# Patient Record
Sex: FEMALE | Race: WHITE | NOT HISPANIC OR LATINO | URBAN - METROPOLITAN AREA
[De-identification: names, ages, dates, MRNs, and addresses within clinical notes are randomized per-mention and may not be internally consistent; named-entity substitution may affect disease eponyms.]

---

## 2020-06-20 ENCOUNTER — OUT OF OFFICE VISIT (OUTPATIENT)
Dept: URBAN - METROPOLITAN AREA MEDICAL CENTER 1 | Facility: MEDICAL CENTER | Age: 80
End: 2020-06-20
Payer: MEDICARE

## 2020-06-20 DIAGNOSIS — D50.9 ANEMIA, IRON DEFICIENCY: ICD-10-CM

## 2020-06-20 DIAGNOSIS — K29.60 ADENOPAPILLOMATOSIS GASTRICA: ICD-10-CM

## 2020-06-20 DIAGNOSIS — K92.1 BLACK STOOL: ICD-10-CM

## 2020-06-20 DIAGNOSIS — D64.89 ANEMIA DUE TO OTHER CAUSE: ICD-10-CM

## 2020-06-20 DIAGNOSIS — R10.13 ABDOMINAL DISCOMFORT, EPIGASTRIC: ICD-10-CM

## 2020-06-20 PROCEDURE — 43239 EGD BIOPSY SINGLE/MULTIPLE: CPT | Performed by: INTERNAL MEDICINE

## 2020-06-20 PROCEDURE — 99214 OFFICE O/P EST MOD 30 MIN: CPT | Performed by: INTERNAL MEDICINE

## 2020-06-29 ENCOUNTER — TELEPHONE ENCOUNTER (OUTPATIENT)
Dept: URBAN - METROPOLITAN AREA CLINIC 92 | Facility: CLINIC | Age: 80
End: 2020-06-29

## 2020-06-30 ENCOUNTER — OFFICE VISIT (OUTPATIENT)
Dept: URBAN - METROPOLITAN AREA TELEHEALTH 2 | Facility: TELEHEALTH | Age: 80
End: 2020-06-30
Payer: MEDICARE

## 2020-06-30 DIAGNOSIS — Z12.11 ROUTINE COLON: ICD-10-CM

## 2020-06-30 DIAGNOSIS — K21.9 GERD (GASTROESOPHAGEAL REFLUX DISEASE): ICD-10-CM

## 2020-06-30 DIAGNOSIS — D50.8 OTHER IRON DEFICIENCY ANEMIAS: ICD-10-CM

## 2020-06-30 PROCEDURE — 99442 PHONE E/M BY PHYS 11-20 MIN: CPT | Performed by: NURSE PRACTITIONER

## 2020-06-30 PROCEDURE — 99441 PHONE E/M BY PHYS 5-10 MIN: CPT | Performed by: NURSE PRACTITIONER

## 2020-06-30 RX ORDER — SUCRALFATE 1 G/1
TAKE 1 TABLET BY ORAL ROUTE 4 TIMES A DAY  1 HOUR BEFORE MEALS AND AT BEDTIME, NOT WITH IN ONE HOUR OF OTHER MEDICATIONS TABLET ORAL
Qty: 360 | Refills: 3 | Status: ACTIVE | COMMUNITY
Start: 2018-02-07

## 2020-06-30 RX ORDER — FENOFIBRATE 150 MG/1
TAKE 1 CAPSULE (150 MG) BY ORAL ROUTE ONCE DAILY WITH FOOD CAPSULE ORAL 1
Qty: 0 | Refills: 0 | Status: ACTIVE | COMMUNITY
Start: 1900-01-01

## 2020-06-30 RX ORDER — PANTOPRAZOLE SODIUM 40 MG/1
TAKE 1 TABLET TWICE A DAY TABLET, DELAYED RELEASE ORAL
Qty: 180 | Refills: 3 | Status: ACTIVE | COMMUNITY
Start: 2019-01-21

## 2020-06-30 RX ORDER — SUCRALFATE 1 G/1
TAKE 1 TABLET BY ORAL ROUTE 2 TIMES A DAY  1 HOUR BEFORE LUNCH AND AT BEDTIME, NOT WITH IN ONE HOUR OF OTHER MEDICATIONS TABLET ORAL BID
Qty: 180 | Refills: 3
Start: 2018-02-07 | End: 2019-02-02

## 2020-06-30 RX ORDER — VENLAFAXINE HYDROCHLORIDE 75 MG/1
TAKE 1 TABLET (75 MG) BY ORAL ROUTE ONCE DAILY IN THE MORNING AT THE SAME TIME EACH DAY WITH FOOD TABLET, EXTENDED RELEASE ORAL 1
Qty: 0 | Refills: 0 | Status: ACTIVE | COMMUNITY
Start: 1900-01-01

## 2020-06-30 RX ORDER — RALOXIFENE HYDROCHLORIDE 60 MG/1
TAKE 1 TABLET (60 MG) BY ORAL ROUTE ONCE DAILY TABLET, FILM COATED ORAL 1
Qty: 0 | Refills: 0 | Status: ACTIVE | COMMUNITY
Start: 1900-01-01

## 2020-06-30 RX ORDER — PANTOPRAZOLE SODIUM 40 MG/1
1 TABLET TABLET, DELAYED RELEASE ORAL BID
Qty: 180 TABLET | Refills: 3
Start: 2019-01-21

## 2020-06-30 RX ORDER — SIMVASTATIN 20 MG/1
TAKE 1 TABLET (20 MG) BY ORAL ROUTE ONCE DAILY IN THE EVENING TABLET, FILM COATED ORAL 1
Qty: 0 | Refills: 0 | Status: ACTIVE | COMMUNITY
Start: 1900-01-01

## 2020-06-30 NOTE — HPI-TODAY'S VISIT:
Ms. Nicole is evaluated via telehealth for GERD and OLEGARIO. She was last seen in 2018. She was anemic in 2017 with a work up that showed hpylori and a large hiatal hernia. She was treated with prevpak and stool proved eradication. Her reflux had been controlled with protonix 40mg BID. She ran out of her medication earlier this year. She had been feeling ok until 2 weeks ago. She started having mid chest pain bringing her to the ER. She had an EGD with Dr. Núñez that showed a 8cm hiatal hernia and camerons erosions- path negative for hpylori. Her Hbg was 8. She is back on her protonix and feeling a little better. SHe is seeing Dr. KISHOR Juares for iron transfusion soon. CS

## 2020-06-30 NOTE — HPI-OTHER HISTORIES
2/7/2018 Ms Aurora Nicole is here for f/u of anemia and GERD. She is followed by LUCHO Juares for anemia. She takes oral iron TID. She had an EGD/COLON 5/2017 that showed some gastritis and hiatal hernia. Biopsies showed H pylori. She was treated with PrevPack with stool that proved eradication. She has been taking protonix 40mg BID but ran out recently 2 weeks ago. Her reflux had been well controlled with only occassional break through. She is having pain in her epigastrum since off Protonix. She denies any bleeding. She had labs last week with Dr. Villarreal. CS

## 2020-09-02 ENCOUNTER — OFFICE VISIT (OUTPATIENT)
Dept: URBAN - NONMETROPOLITAN AREA CLINIC 2 | Facility: CLINIC | Age: 80
End: 2020-09-02
Payer: MEDICARE

## 2020-09-02 DIAGNOSIS — D64.9 ANEMIA: ICD-10-CM

## 2020-09-02 DIAGNOSIS — Z12.11 ROUTINE COLON: ICD-10-CM

## 2020-09-02 DIAGNOSIS — K21.9 GERD (GASTROESOPHAGEAL REFLUX DISEASE): ICD-10-CM

## 2020-09-02 PROCEDURE — G8427 DOCREV CUR MEDS BY ELIG CLIN: HCPCS | Performed by: NURSE PRACTITIONER

## 2020-09-02 PROCEDURE — G8420 CALC BMI NORM PARAMETERS: HCPCS | Performed by: NURSE PRACTITIONER

## 2020-09-02 PROCEDURE — 99213 OFFICE O/P EST LOW 20 MIN: CPT | Performed by: NURSE PRACTITIONER

## 2020-09-02 PROCEDURE — G9903 PT SCRN TBCO ID AS NON USER: HCPCS | Performed by: NURSE PRACTITIONER

## 2020-09-02 RX ORDER — SIMVASTATIN 20 MG/1
TAKE 1 TABLET (20 MG) BY ORAL ROUTE ONCE DAILY IN THE EVENING TABLET, FILM COATED ORAL 1
Qty: 0 | Refills: 0 | Status: ACTIVE | COMMUNITY
Start: 1900-01-01

## 2020-09-02 RX ORDER — PANTOPRAZOLE SODIUM 40 MG/1
1 TABLET TABLET, DELAYED RELEASE ORAL BID
Qty: 180 TABLET | Refills: 3 | Status: ACTIVE | COMMUNITY
Start: 2019-01-21

## 2020-09-02 RX ORDER — FENOFIBRATE 150 MG/1
TAKE 1 CAPSULE (150 MG) BY ORAL ROUTE ONCE DAILY WITH FOOD CAPSULE ORAL 1
Qty: 0 | Refills: 0 | Status: ACTIVE | COMMUNITY
Start: 1900-01-01

## 2020-09-02 RX ORDER — VENLAFAXINE HYDROCHLORIDE 75 MG/1
TAKE 1 TABLET (75 MG) BY ORAL ROUTE ONCE DAILY IN THE MORNING AT THE SAME TIME EACH DAY WITH FOOD TABLET, EXTENDED RELEASE ORAL 1
Qty: 0 | Refills: 0 | Status: ACTIVE | COMMUNITY
Start: 1900-01-01

## 2020-09-02 RX ORDER — PANTOPRAZOLE SODIUM 40 MG/1
1 TABLET TABLET, DELAYED RELEASE ORAL BID
Qty: 180 TABLET | Refills: 3

## 2020-09-02 RX ORDER — RALOXIFENE HYDROCHLORIDE 60 MG/1
TAKE 1 TABLET (60 MG) BY ORAL ROUTE ONCE DAILY TABLET, FILM COATED ORAL 1
Qty: 0 | Refills: 0 | Status: ACTIVE | COMMUNITY
Start: 1900-01-01

## 2020-09-02 NOTE — HPI-TODAY'S VISIT:
Ms. Nicole is evaluated for f/u GERD and OLEGARIO. She has been on protonix 40mg BID and carafate. Her Hbg has improved from 8 to now 12. She has her energy again and feeling well. She denies any return of reflux. Overall, she is feeling well. CS

## 2020-09-02 NOTE — HPI-OTHER HISTORIES
2/7/2018 Ms Aurora Nicole is here for f/u of anemia and GERD. She is followed by LUCHO Juares for anemia. She takes oral iron TID. She had an EGD/COLON 5/2017 that showed some gastritis and hiatal hernia. Biopsies showed H pylori. She was treated with PrevPack with stool that proved eradication. She has been taking protonix 40mg BID but ran out recently 2 weeks ago. Her reflux had been well controlled with only occassional break through. She is having pain in her epigastrum since off Protonix. She denies any bleeding. She had labs last week with Dr. Villarreal. CS   6/30/2020 Ms. Nicole is evaluated via telehealth for GERD and OLEGARIO. She was last seen in 2018. She was anemic in 2017 with a work up that showed hpylori and a large hiatal hernia. She was treated with prevpak and stool proved eradication. Her reflux had been controlled with protonix 40mg BID. She ran out of her medication earlier this year. She had been feeling ok until 2 weeks ago. She started having mid chest pain bringing her to the ER. She had an EGD with Dr. Núñez that showed a 8cm hiatal hernia and camerons erosions- path negative for hpylori. Her Hbg was 8. She is back on her protonix and feeling a little better. SHe is seeing Dr. KISHOR Juares for iron transfusion soon. CS

## 2021-03-03 ENCOUNTER — OFFICE VISIT (OUTPATIENT)
Dept: URBAN - NONMETROPOLITAN AREA CLINIC 2 | Facility: CLINIC | Age: 81
End: 2021-03-03
Payer: MEDICARE

## 2021-03-03 ENCOUNTER — TELEPHONE ENCOUNTER (OUTPATIENT)
Dept: URBAN - METROPOLITAN AREA CLINIC 92 | Facility: CLINIC | Age: 81
End: 2021-03-03

## 2021-03-03 VITALS
HEART RATE: 84 BPM | SYSTOLIC BLOOD PRESSURE: 155 MMHG | HEIGHT: 63 IN | DIASTOLIC BLOOD PRESSURE: 96 MMHG | BODY MASS INDEX: 24.45 KG/M2 | TEMPERATURE: 96.9 F | WEIGHT: 138 LBS

## 2021-03-03 DIAGNOSIS — Z12.11 ROUTINE COLON: ICD-10-CM

## 2021-03-03 DIAGNOSIS — D64.9 ANEMIA: ICD-10-CM

## 2021-03-03 DIAGNOSIS — K21.9 GERD (GASTROESOPHAGEAL REFLUX DISEASE): ICD-10-CM

## 2021-03-03 PROCEDURE — 99213 OFFICE O/P EST LOW 20 MIN: CPT | Performed by: NURSE PRACTITIONER

## 2021-03-03 RX ORDER — PANTOPRAZOLE SODIUM 40 MG/1
1 TABLET TABLET, DELAYED RELEASE ORAL BID
Qty: 180 TABLET | Refills: 3 | Status: ACTIVE | COMMUNITY

## 2021-03-03 RX ORDER — SIMVASTATIN 20 MG/1
TAKE 1 TABLET (20 MG) BY ORAL ROUTE ONCE DAILY IN THE EVENING TABLET, FILM COATED ORAL 1
Qty: 0 | Refills: 0 | Status: ACTIVE | COMMUNITY
Start: 1900-01-01

## 2021-03-03 RX ORDER — FENOFIBRATE 150 MG/1
TAKE 1 CAPSULE (150 MG) BY ORAL ROUTE ONCE DAILY WITH FOOD CAPSULE ORAL 1
Qty: 0 | Refills: 0 | Status: ACTIVE | COMMUNITY
Start: 1900-01-01

## 2021-03-03 RX ORDER — RALOXIFENE HYDROCHLORIDE 60 MG/1
TAKE 1 TABLET (60 MG) BY ORAL ROUTE ONCE DAILY TABLET, FILM COATED ORAL 1
Qty: 0 | Refills: 0 | Status: ACTIVE | COMMUNITY
Start: 1900-01-01

## 2021-03-03 RX ORDER — VENLAFAXINE HYDROCHLORIDE 75 MG/1
TAKE 1 TABLET (75 MG) BY ORAL ROUTE ONCE DAILY IN THE MORNING AT THE SAME TIME EACH DAY WITH FOOD TABLET, EXTENDED RELEASE ORAL 1
Qty: 0 | Refills: 0 | Status: ACTIVE | COMMUNITY
Start: 1900-01-01

## 2021-03-03 RX ORDER — PANTOPRAZOLE SODIUM 40 MG/1
1 TABLET TABLET, DELAYED RELEASE ORAL BID
Qty: 180 TABLET | Refills: 3

## 2021-03-03 NOTE — HPI-TODAY'S VISIT:
3/3/2021 Ms. Nicole is evaluated for f/u GERD and OLEGARIO. She had been on protonix 40mg BID and carafate last year and her Hbg improved from 8 to 12. She was advised to wean off the carafate but stay on protonix because her Hbg continues to drop off of it. Over the last few months, she felt so well she stopped taking the protonix. Recently her Hbg dropped to 11. She is seeing Dr KISHOR Juares tomorrow for blood work. She denies any melena or blood in her stool. She has had reflux symptoms and taking rolaids prn. CS

## 2021-03-03 NOTE — HPI-OTHER HISTORIES
2/7/2018 Ms Aurora Nicole is here for f/u of anemia and GERD. She is followed by LUCHO Juares for anemia. She takes oral iron TID. She had an EGD/COLON 5/2017 that showed some gastritis and hiatal hernia. Biopsies showed H pylori. She was treated with PrevPack with stool that proved eradication. She has been taking protonix 40mg BID but ran out recently 2 weeks ago. Her reflux had been well controlled with only occassional break through. She is having pain in her epigastrum since off Protonix. She denies any bleeding. She had labs last week with Dr. Villarreal. CS   6/30/2020 Ms. Nicloe is evaluated via telehealth for GERD and OLEGARIO. She was last seen in 2018. She was anemic in 2017 with a work up that showed hpylori and a large hiatal hernia. She was treated with prevpak and stool proved eradication. Her reflux had been controlled with protonix 40mg BID. She ran out of her medication earlier this year. She had been feeling ok until 2 weeks ago. She started having mid chest pain bringing her to the ER. She had an EGD with Dr. Núñez that showed a 8cm hiatal hernia and camerons erosions- path negative for hpylori. Her Hbg was 8. She is back on her protonix and feeling a little better. SHe is seeing Dr. KISHOR Juares for iron transfusion soon. CS   9/2/2020 Ms. Nicole is evaluated for f/u GERD and OLEGARIO. She has been on protonix 40mg BID and carafate. Her Hbg has improved from 8 to now 12. She has her energy again and feeling well. She denies any return of reflux. Overall, she is feeling well. CS

## 2021-06-02 ENCOUNTER — OFFICE VISIT (OUTPATIENT)
Dept: URBAN - NONMETROPOLITAN AREA CLINIC 2 | Facility: CLINIC | Age: 81
End: 2021-06-02

## 2021-07-21 ENCOUNTER — OFFICE VISIT (OUTPATIENT)
Dept: URBAN - NONMETROPOLITAN AREA CLINIC 2 | Facility: CLINIC | Age: 81
End: 2021-07-21
Payer: MEDICARE

## 2021-07-21 ENCOUNTER — WEB ENCOUNTER (OUTPATIENT)
Dept: URBAN - NONMETROPOLITAN AREA CLINIC 2 | Facility: CLINIC | Age: 81
End: 2021-07-21

## 2021-07-21 VITALS
BODY MASS INDEX: 23.6 KG/M2 | HEART RATE: 88 BPM | WEIGHT: 133.2 LBS | SYSTOLIC BLOOD PRESSURE: 147 MMHG | TEMPERATURE: 97.2 F | HEIGHT: 63 IN | DIASTOLIC BLOOD PRESSURE: 89 MMHG

## 2021-07-21 DIAGNOSIS — K21.9 GERD (GASTROESOPHAGEAL REFLUX DISEASE): ICD-10-CM

## 2021-07-21 DIAGNOSIS — D50.8 ACQUIRED IRON DEFICIENCY ANEMIA DUE TO DECREASED ABSORPTION: ICD-10-CM

## 2021-07-21 DIAGNOSIS — Z12.11 ROUTINE COLON: ICD-10-CM

## 2021-07-21 PROCEDURE — 99213 OFFICE O/P EST LOW 20 MIN: CPT | Performed by: INTERNAL MEDICINE

## 2021-07-21 RX ORDER — RALOXIFENE HYDROCHLORIDE 60 MG/1
TAKE 1 TABLET (60 MG) BY ORAL ROUTE ONCE DAILY TABLET, FILM COATED ORAL 1
Qty: 0 | Refills: 0 | COMMUNITY
Start: 1900-01-01

## 2021-07-21 RX ORDER — SIMVASTATIN 20 MG/1
TAKE 1 TABLET (20 MG) BY ORAL ROUTE ONCE DAILY IN THE EVENING TABLET, FILM COATED ORAL 1
Qty: 0 | Refills: 0 | COMMUNITY
Start: 1900-01-01

## 2021-07-21 RX ORDER — PANTOPRAZOLE SODIUM 40 MG/1
1 TABLET TABLET, DELAYED RELEASE ORAL BID
Qty: 180 TABLET | Refills: 3

## 2021-07-21 RX ORDER — VENLAFAXINE HYDROCHLORIDE 75 MG/1
TAKE 1 TABLET (75 MG) BY ORAL ROUTE ONCE DAILY IN THE MORNING AT THE SAME TIME EACH DAY WITH FOOD TABLET, EXTENDED RELEASE ORAL 1
Qty: 0 | Refills: 0 | COMMUNITY
Start: 1900-01-01

## 2021-07-21 RX ORDER — FENOFIBRATE 150 MG/1
TAKE 1 CAPSULE (150 MG) BY ORAL ROUTE ONCE DAILY WITH FOOD CAPSULE ORAL 1
Qty: 0 | Refills: 0 | COMMUNITY
Start: 1900-01-01

## 2021-07-21 RX ORDER — PANTOPRAZOLE SODIUM 40 MG/1
1 TABLET TABLET, DELAYED RELEASE ORAL BID
Qty: 180 TABLET | Refills: 3 | COMMUNITY

## 2021-07-21 NOTE — HPI-OTHER HISTORIES
2/7/2018 Ms Aurora Nicole is here for f/u of anemia and GERD. She is followed by LUCHO Juares for anemia. She takes oral iron TID. She had an EGD/COLON 5/2017 that showed some gastritis and hiatal hernia. Biopsies showed H pylori. She was treated with PrevPack with stool that proved eradication. She has been taking protonix 40mg BID but ran out recently 2 weeks ago. Her reflux had been well controlled with only occassional break through. She is having pain in her epigastrum since off Protonix. She denies any bleeding. She had labs last week with Dr. Villarreal. CS   6/30/2020 Ms. Nicole is evaluated via telehealth for GERD and OLEGARIO. She was last seen in 2018. She was anemic in 2017 with a work up that showed hpylori and a large hiatal hernia. She was treated with prevpak and stool proved eradication. Her reflux had been controlled with protonix 40mg BID. She ran out of her medication earlier this year. She had been feeling ok until 2 weeks ago. She started having mid chest pain bringing her to the ER. She had an EGD with Dr. Núñez that showed a 8cm hiatal hernia and camerons erosions- path negative for hpylori. Her Hbg was 8. She is back on her protonix and feeling a little better. SHe is seeing Dr. KISHOR Juares for iron transfusion soon. CS   9/2/2020 Ms. Nicole is evaluated for f/u GERD and OLEGARIO. She has been on protonix 40mg BID and carafate. Her Hbg has improved from 8 to now 12. She has her energy again and feeling well. She denies any return of reflux. Overall, she is feeling well. CS   3/3/2021 Ms. Nicole is evaluated for f/u GERD and OLEGARIO. She had been on protonix 40mg BID and carafate last year and her Hbg improved from 8 to 12. She was advised to wean off the carafate but stay on protonix because her Hbg continues to drop off of it. Over the last few months, she felt so well she stopped taking the protonix. Recently her Hbg dropped to 11. She is seeing Dr KISHOR Juares tomorrow for blood work. She denies any melena or blood in her stool. She has had reflux symptoms and taking rolaids prn. CS

## 2021-07-21 NOTE — HPI-TODAY'S VISIT:
7/21/2021 Ms. Nicole is here for f/u GERD and OLEGARIO. She has a large hiatal hernia and thought to be the cause of her anemia. In March, her Hbg was 11 with low iron. She was off the carafate and protonix. The protonix was added back BID and pepcid prn. Since then, her Hbg has improved to 13. Her reflux is fairly well controlled with the medication and small meals. She only uses the pepcid about 2-3 times a week. Overall, she is feeling well. CS

## 2021-10-18 ENCOUNTER — OFFICE VISIT (OUTPATIENT)
Dept: URBAN - NONMETROPOLITAN AREA CLINIC 2 | Facility: CLINIC | Age: 81
End: 2021-10-18
Payer: MEDICARE

## 2021-10-18 ENCOUNTER — WEB ENCOUNTER (OUTPATIENT)
Dept: URBAN - NONMETROPOLITAN AREA CLINIC 2 | Facility: CLINIC | Age: 81
End: 2021-10-18

## 2021-10-18 VITALS
HEIGHT: 63 IN | BODY MASS INDEX: 24.1 KG/M2 | HEART RATE: 78 BPM | DIASTOLIC BLOOD PRESSURE: 92 MMHG | TEMPERATURE: 97.2 F | WEIGHT: 136 LBS | SYSTOLIC BLOOD PRESSURE: 162 MMHG

## 2021-10-18 DIAGNOSIS — Z12.11 ROUTINE COLON: ICD-10-CM

## 2021-10-18 DIAGNOSIS — D64.9 ANEMIA: ICD-10-CM

## 2021-10-18 DIAGNOSIS — K21.9 GERD (GASTROESOPHAGEAL REFLUX DISEASE): ICD-10-CM

## 2021-10-18 PROCEDURE — 99214 OFFICE O/P EST MOD 30 MIN: CPT | Performed by: NURSE PRACTITIONER

## 2021-10-18 RX ORDER — PANTOPRAZOLE SODIUM 40 MG/1
1 TABLET TABLET, DELAYED RELEASE ORAL BID
Qty: 180 TABLET | Refills: 3

## 2021-10-18 RX ORDER — SIMVASTATIN 20 MG/1
TAKE 1 TABLET (20 MG) BY ORAL ROUTE ONCE DAILY IN THE EVENING TABLET, FILM COATED ORAL 1
Qty: 0 | Refills: 0 | COMMUNITY
Start: 1900-01-01

## 2021-10-18 RX ORDER — FENOFIBRATE 150 MG/1
TAKE 1 CAPSULE (150 MG) BY ORAL ROUTE ONCE DAILY WITH FOOD CAPSULE ORAL 1
Qty: 0 | Refills: 0 | COMMUNITY
Start: 1900-01-01

## 2021-10-18 RX ORDER — PANTOPRAZOLE SODIUM 40 MG/1
1 TABLET TABLET, DELAYED RELEASE ORAL BID
Qty: 180 TABLET | Refills: 3 | COMMUNITY

## 2021-10-18 RX ORDER — VENLAFAXINE HYDROCHLORIDE 75 MG/1
TAKE 1 TABLET (75 MG) BY ORAL ROUTE ONCE DAILY IN THE MORNING AT THE SAME TIME EACH DAY WITH FOOD TABLET, EXTENDED RELEASE ORAL 1
Qty: 0 | Refills: 0 | COMMUNITY
Start: 1900-01-01

## 2021-10-18 RX ORDER — FAMOTIDINE 40 MG/1
1 TABLET 1-2 TIMES A DAY PRN REFLUX TABLET, FILM COATED ORAL BID
Qty: 180 TABLET | Refills: 3 | OUTPATIENT
Start: 2021-10-18

## 2021-10-18 RX ORDER — RALOXIFENE HYDROCHLORIDE 60 MG/1
TAKE 1 TABLET (60 MG) BY ORAL ROUTE ONCE DAILY TABLET, FILM COATED ORAL 1
Qty: 0 | Refills: 0 | COMMUNITY
Start: 1900-01-01

## 2021-10-18 NOTE — HPI-OTHER HISTORIES
2/7/2018 Ms Aurora Nicole is here for f/u of anemia and GERD. She is followed by LUCHO Juares for anemia. She takes oral iron TID. She had an EGD/COLON 5/2017 that showed some gastritis and hiatal hernia. Biopsies showed H pylori. She was treated with PrevPack with stool that proved eradication. She has been taking protonix 40mg BID but ran out recently 2 weeks ago. Her reflux had been well controlled with only occassional break through. She is having pain in her epigastrum since off Protonix. She denies any bleeding. She had labs last week with Dr. Villarreal. CS   6/30/2020 Ms. Nicole is evaluated via telehealth for GERD and OLEGARIO. She was last seen in 2018. She was anemic in 2017 with a work up that showed hpylori and a large hiatal hernia. She was treated with prevpak and stool proved eradication. Her reflux had been controlled with protonix 40mg BID. She ran out of her medication earlier this year. She had been feeling ok until 2 weeks ago. She started having mid chest pain bringing her to the ER. She had an EGD with Dr. Núñez that showed a 8cm hiatal hernia and camerons erosions- path negative for hpylori. Her Hbg was 8. She is back on her protonix and feeling a little better. SHe is seeing Dr. KISHOR Juares for iron transfusion soon. CS   9/2/2020 Ms. Nicole is evaluated for f/u GERD and OLEGARIO. She has been on protonix 40mg BID and carafate. Her Hbg has improved from 8 to now 12. She has her energy again and feeling well. She denies any return of reflux. Overall, she is feeling well. CS   3/3/2021 Ms. Nicole is evaluated for f/u GERD and OLEGARIO. She had been on protonix 40mg BID and carafate last year and her Hbg improved from 8 to 12. She was advised to wean off the carafate but stay on protonix because her Hbg continues to drop off of it. Over the last few months, she felt so well she stopped taking the protonix. Recently her Hbg dropped to 11. She is seeing Dr KISHOR Juares tomorrow for blood work. She denies any melena or blood in her stool. She has had reflux symptoms and taking rolaids prn. CS   7/21/2021 Ms. Nicole is here for f/u GERD and OLEGARIO. She has a large hiatal hernia and thought to be the cause of her anemia. In March, her Hbg was 11 with low iron. She was off the carafate and protonix. The protonix was added back BID and pepcid prn. Since then, her Hbg has improved to 13. Her reflux is fairly well controlled with the medication and small meals. She only uses the pepcid about 2-3 times a week. Overall, she is feeling well. CS

## 2021-10-18 NOTE — HPI-TODAY'S VISIT:
10/18/2021 Ms. Nicole is here for f/u GERD and OLEGARIO. She has a large hiatal hernia and thought to be the cause of her anemia. In March, her Hbg was 11 with low iron. She was off the carafate and protonix. The protonix was added back BID and pepcid prn. Her Hbg has improved to 13. She had repeat labs last month again Hbg of 13. She denies any reflux and has not needed the pepcid. She had a DEXA scan with worsening osteoparosis.  CS

## 2022-01-10 ENCOUNTER — OFFICE VISIT (OUTPATIENT)
Dept: URBAN - NONMETROPOLITAN AREA CLINIC 2 | Facility: CLINIC | Age: 82
End: 2022-01-10

## 2022-01-10 ENCOUNTER — ERX REFILL RESPONSE (OUTPATIENT)
Dept: URBAN - NONMETROPOLITAN AREA CLINIC 2 | Facility: CLINIC | Age: 82
End: 2022-01-10

## 2022-01-10 RX ORDER — PANTOPRAZOLE SODIUM 40 MG/1
TAKE 1 TABLET TWICE A DAY TABLET, DELAYED RELEASE ORAL
Qty: 180 TABLET | Refills: 4 | OUTPATIENT

## 2022-01-10 RX ORDER — PANTOPRAZOLE SODIUM 40 MG/1
1 TABLET TABLET, DELAYED RELEASE ORAL BID
Qty: 180 TABLET | Refills: 3 | OUTPATIENT

## 2022-01-18 ENCOUNTER — LAB OUTSIDE AN ENCOUNTER (OUTPATIENT)
Dept: URBAN - NONMETROPOLITAN AREA CLINIC 2 | Facility: CLINIC | Age: 82
End: 2022-01-18

## 2022-03-07 ENCOUNTER — OFFICE VISIT (OUTPATIENT)
Dept: URBAN - NONMETROPOLITAN AREA CLINIC 2 | Facility: CLINIC | Age: 82
End: 2022-03-07

## 2022-03-07 RX ORDER — FAMOTIDINE 40 MG/1
1 TABLET 1-2 TIMES A DAY PRN REFLUX TABLET, FILM COATED ORAL BID
Qty: 180 TABLET | Refills: 3 | OUTPATIENT

## 2022-03-07 RX ORDER — PANTOPRAZOLE SODIUM 40 MG/1
1 TABLET TABLET, DELAYED RELEASE ORAL BID
Qty: 180 TABLET | Refills: 3

## 2022-03-07 RX ORDER — FENOFIBRATE 150 MG/1
TAKE 1 CAPSULE (150 MG) BY ORAL ROUTE ONCE DAILY WITH FOOD CAPSULE ORAL 1
Qty: 0 | Refills: 0 | COMMUNITY
Start: 1900-01-01

## 2022-03-07 RX ORDER — FAMOTIDINE 40 MG/1
1 TABLET 1-2 TIMES A DAY PRN REFLUX TABLET, FILM COATED ORAL BID
Qty: 180 TABLET | Refills: 3 | Status: ACTIVE | COMMUNITY
Start: 2021-10-18

## 2022-03-07 RX ORDER — SIMVASTATIN 20 MG/1
TAKE 1 TABLET (20 MG) BY ORAL ROUTE ONCE DAILY IN THE EVENING TABLET, FILM COATED ORAL 1
Qty: 0 | Refills: 0 | COMMUNITY
Start: 1900-01-01

## 2022-03-07 RX ORDER — VENLAFAXINE HYDROCHLORIDE 75 MG/1
TAKE 1 TABLET (75 MG) BY ORAL ROUTE ONCE DAILY IN THE MORNING AT THE SAME TIME EACH DAY WITH FOOD TABLET, EXTENDED RELEASE ORAL 1
Qty: 0 | Refills: 0 | COMMUNITY
Start: 1900-01-01

## 2022-03-07 RX ORDER — RALOXIFENE HYDROCHLORIDE 60 MG/1
TAKE 1 TABLET (60 MG) BY ORAL ROUTE ONCE DAILY TABLET, FILM COATED ORAL 1
Qty: 0 | Refills: 0 | COMMUNITY
Start: 1900-01-01

## 2022-03-07 RX ORDER — PANTOPRAZOLE SODIUM 40 MG/1
TAKE 1 TABLET TWICE A DAY TABLET, DELAYED RELEASE ORAL
Qty: 180 TABLET | Refills: 4 | Status: ACTIVE | COMMUNITY

## 2022-03-07 NOTE — HPI-TODAY'S VISIT:
3/7/2022 Ms. Nicole is here for f/u GERD and OLEGARIO. She has a large hiatal hernia and thought to be the cause of her anemia. In March, her Hbg was 11 with low iron. She was off the carafate and protonix. The protonix was added back BID and pepcid prn. Her Hbg has improved to 13. She had repeat labs last month again Hbg of 13. She denies any reflux and has not needed the pepcid. She had a DEXA scan with worsening osteoparosis.  CS

## 2022-03-07 NOTE — PHYSICAL EXAM EYES:
Conjuntivae and eyelids appear normal, Sclerae : White without injection Please get the information for insurance/CareCard screening from the  when you make your appt today. You can contact our , or nurse navigator if you have questions after your visit.

## 2022-03-07 NOTE — HPI-OTHER HISTORIES
2/7/2018 Ms Aurora Nicole is here for f/u of anemia and GERD. She is followed by LUCHO Juares for anemia. She takes oral iron TID. She had an EGD/COLON 5/2017 that showed some gastritis and hiatal hernia. Biopsies showed H pylori. She was treated with PrevPack with stool that proved eradication. She has been taking protonix 40mg BID but ran out recently 2 weeks ago. Her reflux had been well controlled with only occassional break through. She is having pain in her epigastrum since off Protonix. She denies any bleeding. She had labs last week with Dr. Villarreal. CS   6/30/2020 Ms. Nicole is evaluated via telehealth for GERD and OLEGARIO. She was last seen in 2018. She was anemic in 2017 with a work up that showed hpylori and a large hiatal hernia. She was treated with prevpak and stool proved eradication. Her reflux had been controlled with protonix 40mg BID. She ran out of her medication earlier this year. She had been feeling ok until 2 weeks ago. She started having mid chest pain bringing her to the ER. She had an EGD with Dr. Núñez that showed a 8cm hiatal hernia and camerons erosions- path negative for hpylori. Her Hbg was 8. She is back on her protonix and feeling a little better. SHe is seeing Dr. KISHOR Juares for iron transfusion soon. CS   9/2/2020 Ms. Nicole is evaluated for f/u GERD and OLEGARIO. She has been on protonix 40mg BID and carafate. Her Hbg has improved from 8 to now 12. She has her energy again and feeling well. She denies any return of reflux. Overall, she is feeling well. CS   3/3/2021 Ms. Nicole is evaluated for f/u GERD and OLEGARIO. She had been on protonix 40mg BID and carafate last year and her Hbg improved from 8 to 12. She was advised to wean off the carafate but stay on protonix because her Hbg continues to drop off of it. Over the last few months, she felt so well she stopped taking the protonix. Recently her Hbg dropped to 11. She is seeing Dr KISHOR Juares tomorrow for blood work. She denies any melena or blood in her stool. She has had reflux symptoms and taking rolaids prn. CS   7/21/2021 Ms. Nicole is here for f/u GERD and OLEGARIO. She has a large hiatal hernia and thought to be the cause of her anemia. In March, her Hbg was 11 with low iron. She was off the carafate and protonix. The protonix was added back BID and pepcid prn. Since then, her Hbg has improved to 13. Her reflux is fairly well controlled with the medication and small meals. She only uses the pepcid about 2-3 times a week. Overall, she is feeling well. CS   10/18/2021 Ms. Nicole is here for f/u GERD and OLEGARIO. She has a large hiatal hernia and thought to be the cause of her anemia. In March, her Hbg was 11 with low iron. She was off the carafate and protonix. The protonix was added back BID and pepcid prn. Her Hbg has improved to 13. She had repeat labs last month again Hbg of 13. She denies any reflux and has not needed the pepcid. She had a DEXA scan with worsening osteoparosis.  CS

## 2022-05-25 ENCOUNTER — WEB ENCOUNTER (OUTPATIENT)
Dept: URBAN - NONMETROPOLITAN AREA CLINIC 2 | Facility: CLINIC | Age: 82
End: 2022-05-25

## 2022-05-25 ENCOUNTER — CLAIMS CREATED FROM THE CLAIM WINDOW (OUTPATIENT)
Dept: URBAN - NONMETROPOLITAN AREA CLINIC 2 | Facility: CLINIC | Age: 82
End: 2022-05-25
Payer: MEDICARE

## 2022-05-25 ENCOUNTER — TELEPHONE ENCOUNTER (OUTPATIENT)
Dept: URBAN - METROPOLITAN AREA CLINIC 92 | Facility: CLINIC | Age: 82
End: 2022-05-25

## 2022-05-25 ENCOUNTER — LAB OUTSIDE AN ENCOUNTER (OUTPATIENT)
Dept: URBAN - NONMETROPOLITAN AREA CLINIC 2 | Facility: CLINIC | Age: 82
End: 2022-05-25

## 2022-05-25 VITALS
BODY MASS INDEX: 23.67 KG/M2 | HEIGHT: 63 IN | DIASTOLIC BLOOD PRESSURE: 99 MMHG | WEIGHT: 133.6 LBS | TEMPERATURE: 97 F | HEART RATE: 81 BPM | SYSTOLIC BLOOD PRESSURE: 156 MMHG

## 2022-05-25 DIAGNOSIS — K21.9 GERD (GASTROESOPHAGEAL REFLUX DISEASE): ICD-10-CM

## 2022-05-25 DIAGNOSIS — Z12.11 ROUTINE COLON: ICD-10-CM

## 2022-05-25 DIAGNOSIS — R19.4 CHANGE IN BOWEL HABIT: ICD-10-CM

## 2022-05-25 DIAGNOSIS — D64.89 ANEMIA DUE TO OTHER CAUSE: ICD-10-CM

## 2022-05-25 DIAGNOSIS — D64.9 ANEMIA: ICD-10-CM

## 2022-05-25 PROCEDURE — 99214 OFFICE O/P EST MOD 30 MIN: CPT | Performed by: NURSE PRACTITIONER

## 2022-05-25 RX ORDER — PANTOPRAZOLE SODIUM 40 MG/1
1 TABLET TABLET, DELAYED RELEASE ORAL BID
Qty: 180 TABLET | Refills: 3 | Status: ACTIVE | COMMUNITY

## 2022-05-25 RX ORDER — FENOFIBRATE 134 MG/1
CAPSULE ORAL
Qty: 90 | Status: ACTIVE | COMMUNITY

## 2022-05-25 RX ORDER — PANTOPRAZOLE SODIUM 40 MG/1
TAKE 1 TABLET TWICE A DAY TABLET, DELAYED RELEASE ORAL
Qty: 180 TABLET | Refills: 4 | Status: ACTIVE | COMMUNITY

## 2022-05-25 RX ORDER — PANTOPRAZOLE SODIUM 40 MG/1
1 TABLET TABLET, DELAYED RELEASE ORAL BID
Qty: 180 TABLET | Refills: 3

## 2022-05-25 RX ORDER — FAMOTIDINE 40 MG/1
1 TABLET 1-2 TIMES A DAY PRN REFLUX TABLET, FILM COATED ORAL BID
Qty: 180 TABLET | Refills: 3 | OUTPATIENT

## 2022-05-25 RX ORDER — FAMOTIDINE 40 MG/1
1 TABLET 1-2 TIMES A DAY PRN REFLUX TABLET, FILM COATED ORAL BID
Qty: 180 TABLET | Refills: 3 | Status: ACTIVE | COMMUNITY

## 2022-05-25 RX ORDER — SIMVASTATIN 20 MG/1
TAKE 1 TABLET (20 MG) BY ORAL ROUTE ONCE DAILY IN THE EVENING TABLET, FILM COATED ORAL 1
Qty: 0 | Refills: 0 | Status: ACTIVE | COMMUNITY
Start: 1900-01-01

## 2022-05-25 RX ORDER — IBANDRONATE SODIUM 150 MG/1
TABLET, FILM COATED ORAL
Qty: 3 | Status: ACTIVE | COMMUNITY

## 2022-05-25 RX ORDER — VENLAFAXINE HYDROCHLORIDE 75 MG/1
TAKE 1 TABLET (75 MG) BY ORAL ROUTE ONCE DAILY IN THE MORNING AT THE SAME TIME EACH DAY WITH FOOD TABLET, EXTENDED RELEASE ORAL 1
Qty: 0 | Refills: 0 | Status: ACTIVE | COMMUNITY
Start: 1900-01-01

## 2022-05-25 RX ORDER — PSYLLIUM HUSK 0.4 G
1 CAPSULE WITH 8 OUNCES OF LIQUID AT BEDTIME CAPSULE ORAL DAILY
Qty: 60 | Refills: 6 | OUTPATIENT
Start: 2022-05-25 | End: 2022-12-20

## 2022-05-25 NOTE — HPI-TODAY'S VISIT:
5/25/2022 Ms. Nicole is here for f/u GERD and OLEGARIO. She was last seen in October. Her Hbg had returned to normal BID ppi so it was reduced to daily. Today, she reports no return of reflux but her Hgb and iron have dropped and she has been getting iron infusions. She has also had a change in her bowels with no BM for several days followed by loose unformed stool. She denies any blood in her stool or melena. CS

## 2022-09-12 ENCOUNTER — CLAIMS CREATED FROM THE CLAIM WINDOW (OUTPATIENT)
Dept: URBAN - METROPOLITAN AREA CLINIC 4 | Facility: CLINIC | Age: 82
End: 2022-09-12
Payer: MEDICARE

## 2022-09-12 ENCOUNTER — OFFICE VISIT (OUTPATIENT)
Dept: URBAN - NONMETROPOLITAN AREA SURGERY CENTER 1 | Facility: SURGERY CENTER | Age: 82
End: 2022-09-12
Payer: MEDICARE

## 2022-09-12 DIAGNOSIS — D50.9 ANEMIA: ICD-10-CM

## 2022-09-12 DIAGNOSIS — K31.89 OTHER DISEASES OF STOMACH AND DUODENUM: ICD-10-CM

## 2022-09-12 DIAGNOSIS — K31.89 ACQUIRED DEFORMITY OF DUODENUM: ICD-10-CM

## 2022-09-12 PROCEDURE — 45378 DIAGNOSTIC COLONOSCOPY: CPT | Performed by: INTERNAL MEDICINE

## 2022-09-12 PROCEDURE — 88305 TISSUE EXAM BY PATHOLOGIST: CPT | Performed by: PATHOLOGY

## 2022-09-12 PROCEDURE — 88312 SPECIAL STAINS GROUP 1: CPT | Performed by: PATHOLOGY

## 2022-09-12 PROCEDURE — G8907 PT DOC NO EVENTS ON DISCHARG: HCPCS | Performed by: INTERNAL MEDICINE

## 2022-09-12 PROCEDURE — 43239 EGD BIOPSY SINGLE/MULTIPLE: CPT | Performed by: INTERNAL MEDICINE

## 2022-10-05 ENCOUNTER — OFFICE VISIT (OUTPATIENT)
Dept: URBAN - NONMETROPOLITAN AREA CLINIC 2 | Facility: CLINIC | Age: 82
End: 2022-10-05
Payer: MEDICARE

## 2022-10-05 ENCOUNTER — TELEPHONE ENCOUNTER (OUTPATIENT)
Dept: URBAN - METROPOLITAN AREA CLINIC 92 | Facility: CLINIC | Age: 82
End: 2022-10-05

## 2022-10-05 VITALS
BODY MASS INDEX: 23.92 KG/M2 | DIASTOLIC BLOOD PRESSURE: 95 MMHG | HEART RATE: 79 BPM | HEIGHT: 63 IN | WEIGHT: 135 LBS | SYSTOLIC BLOOD PRESSURE: 161 MMHG

## 2022-10-05 DIAGNOSIS — D50.8 ACQUIRED IRON DEFICIENCY ANEMIA DUE TO DECREASED ABSORPTION: ICD-10-CM

## 2022-10-05 DIAGNOSIS — Z12.11 ROUTINE COLON: ICD-10-CM

## 2022-10-05 DIAGNOSIS — R19.4 CHANGE IN BOWEL HABIT: ICD-10-CM

## 2022-10-05 DIAGNOSIS — K21.9 GERD (GASTROESOPHAGEAL REFLUX DISEASE): ICD-10-CM

## 2022-10-05 PROCEDURE — 99213 OFFICE O/P EST LOW 20 MIN: CPT | Performed by: NURSE PRACTITIONER

## 2022-10-05 RX ORDER — PSYLLIUM HUSK 0.4 G
1 CAPSULE WITH 8 OUNCES OF LIQUID AT BEDTIME CAPSULE ORAL DAILY
Qty: 60 | Refills: 6 | Status: ACTIVE | COMMUNITY
Start: 2022-05-25 | End: 2022-12-20

## 2022-10-05 RX ORDER — PANTOPRAZOLE SODIUM 40 MG/1
TAKE 1 TABLET TWICE A DAY TABLET, DELAYED RELEASE ORAL
Qty: 180 TABLET | Refills: 4 | Status: ACTIVE | COMMUNITY

## 2022-10-05 RX ORDER — IBANDRONATE SODIUM 150 MG/1
TABLET, FILM COATED ORAL
Qty: 3 | Status: ACTIVE | COMMUNITY

## 2022-10-05 RX ORDER — SIMVASTATIN 20 MG/1
TAKE 1 TABLET (20 MG) BY ORAL ROUTE ONCE DAILY IN THE EVENING TABLET, FILM COATED ORAL 1
Qty: 0 | Refills: 0 | Status: ACTIVE | COMMUNITY
Start: 1900-01-01

## 2022-10-05 RX ORDER — PANTOPRAZOLE SODIUM 40 MG/1
1 TABLET TABLET, DELAYED RELEASE ORAL BID
Qty: 180 TABLET | Refills: 3

## 2022-10-05 RX ORDER — FAMOTIDINE 40 MG/1
1 TABLET 1-2 TIMES A DAY PRN REFLUX TABLET, FILM COATED ORAL BID
Qty: 180 TABLET | Refills: 3 | Status: ACTIVE | COMMUNITY

## 2022-10-05 RX ORDER — PANTOPRAZOLE SODIUM 40 MG/1
1 TABLET TABLET, DELAYED RELEASE ORAL BID
Qty: 180 TABLET | Refills: 3 | Status: ACTIVE | COMMUNITY

## 2022-10-05 RX ORDER — PSYLLIUM HUSK 0.4 G
1 CAPSULE WITH 8 OUNCES OF LIQUID AT BEDTIME CAPSULE ORAL DAILY
Qty: 60 | Refills: 6 | OUTPATIENT

## 2022-10-05 RX ORDER — VENLAFAXINE HYDROCHLORIDE 75 MG/1
TAKE 1 TABLET (75 MG) BY ORAL ROUTE ONCE DAILY IN THE MORNING AT THE SAME TIME EACH DAY WITH FOOD TABLET, EXTENDED RELEASE ORAL 1
Qty: 0 | Refills: 0 | Status: ACTIVE | COMMUNITY
Start: 1900-01-01

## 2022-10-05 RX ORDER — FAMOTIDINE 40 MG/1
1 TABLET 1-2 TIMES A DAY PRN REFLUX TABLET, FILM COATED ORAL BID
Qty: 180 TABLET | Refills: 3 | OUTPATIENT

## 2022-10-05 RX ORDER — FENOFIBRATE 134 MG/1
CAPSULE ORAL
Qty: 90 | Status: ACTIVE | COMMUNITY

## 2022-10-05 NOTE — HPI-OTHER HISTORIES
2/7/2018 Ms Aurora Nicole is here for f/u of anemia and GERD. She is followed by LUCHO Juares for anemia. She takes oral iron TID. She had an EGD/COLON 5/2017 that showed some gastritis and hiatal hernia. Biopsies showed H pylori. She was treated with PrevPack with stool that proved eradication. She has been taking protonix 40mg BID but ran out recently 2 weeks ago. Her reflux had been well controlled with only occassional break through. She is having pain in her epigastrum since off Protonix. She denies any bleeding. She had labs last week with Dr. Villarreal. CS   6/30/2020 Ms. Nicole is evaluated via telehealth for GERD and OLEGARIO. She was last seen in 2018. She was anemic in 2017 with a work up that showed hpylori and a large hiatal hernia. She was treated with prevpak and stool proved eradication. Her reflux had been controlled with protonix 40mg BID. She ran out of her medication earlier this year. She had been feeling ok until 2 weeks ago. She started having mid chest pain bringing her to the ER. She had an EGD with Dr. Núñez that showed a 8cm hiatal hernia and camerons erosions- path negative for hpylori. Her Hbg was 8. She is back on her protonix and feeling a little better. SHe is seeing Dr. KISHOR Juares for iron transfusion soon. CS   9/2/2020 Ms. Nicole is evaluated for f/u GERD and OLEGARIO. She has been on protonix 40mg BID and carafate. Her Hbg has improved from 8 to now 12. She has her energy again and feeling well. She denies any return of reflux. Overall, she is feeling well. CS   3/3/2021 Ms. Nicole is evaluated for f/u GERD and OLEGARIO. She had been on protonix 40mg BID and carafate last year and her Hbg improved from 8 to 12. She was advised to wean off the carafate but stay on protonix because her Hbg continues to drop off of it. Over the last few months, she felt so well she stopped taking the protonix. Recently her Hbg dropped to 11. She is seeing Dr KISHOR Juares tomorrow for blood work. She denies any melena or blood in her stool. She has had reflux symptoms and taking rolaids prn. CS   7/21/2021 Ms. Nicole is here for f/u GERD and OLEGARIO. She has a large hiatal hernia and thought to be the cause of her anemia. In March, her Hbg was 11 with low iron. She was off the carafate and protonix. The protonix was added back BID and pepcid prn. Since then, her Hbg has improved to 13. Her reflux is fairly well controlled with the medication and small meals. She only uses the pepcid about 2-3 times a week. Overall, she is feeling well. CS   10/18/2021 Ms. Nicole is here for f/u GERD and OLEGARIO. She has a large hiatal hernia and thought to be the cause of her anemia. In March, her Hbg was 11 with low iron. She was off the carafate and protonix. The protonix was added back BID and pepcid prn. Her Hbg has improved to 13. She had repeat labs last month again Hbg of 13. She denies any reflux and has not needed the pepcid. She had a DEXA scan with worsening osteoparosis.  CS  5/25/2022 Ms. Nicole is here for f/u GERD and OLEGARIO. She was last seen in October. Her Hbg had returned to normal BID ppi so it was reduced to daily. Today, she reports no return of reflux but her Hgb and iron have dropped and she has been getting iron infusions. She has also had a change in her bowels with no BM for several days followed by loose unformed stool. She denies any blood in her stool or melena. CS  9/15/2022 EGD: 6cm hiatal hernia, gastritis. Path negative Colonoscopy: excessive looping, leftsided diverticulosis, hemorrhiods

## 2022-10-05 NOTE — HPI-TODAY'S VISIT:
10/5/2022 Ms. Nicole is here for f/u GERD and OLEGARIO. In May, her Hbg and iron had dropped. She had an EGD and colon with no source. She is getting labs in the next month. She denies any blood in her stool or melena. Her reflux is well controlled with protonix 40mg daily. She has  not needed the pepcid. Her bowels are moving well with the fiber. CS

## 2023-04-12 ENCOUNTER — WEB ENCOUNTER (OUTPATIENT)
Dept: URBAN - NONMETROPOLITAN AREA CLINIC 13 | Facility: CLINIC | Age: 83
End: 2023-04-12

## 2023-04-12 ENCOUNTER — OFFICE VISIT (OUTPATIENT)
Dept: URBAN - NONMETROPOLITAN AREA CLINIC 13 | Facility: CLINIC | Age: 83
End: 2023-04-12
Payer: MEDICARE

## 2023-04-12 ENCOUNTER — LAB OUTSIDE AN ENCOUNTER (OUTPATIENT)
Dept: URBAN - NONMETROPOLITAN AREA CLINIC 13 | Facility: CLINIC | Age: 83
End: 2023-04-12

## 2023-04-12 VITALS
WEIGHT: 137 LBS | HEIGHT: 63 IN | SYSTOLIC BLOOD PRESSURE: 149 MMHG | HEART RATE: 76 BPM | BODY MASS INDEX: 24.27 KG/M2 | DIASTOLIC BLOOD PRESSURE: 89 MMHG

## 2023-04-12 DIAGNOSIS — K21.9 GERD (GASTROESOPHAGEAL REFLUX DISEASE): ICD-10-CM

## 2023-04-12 DIAGNOSIS — R19.4 CHANGE IN BOWEL HABIT: ICD-10-CM

## 2023-04-12 DIAGNOSIS — Z12.11 ROUTINE COLON: ICD-10-CM

## 2023-04-12 DIAGNOSIS — R10.84 GENERALIZED ABDOMINAL PAIN: ICD-10-CM

## 2023-04-12 DIAGNOSIS — D64.89 ANEMIA DUE TO OTHER CAUSE: ICD-10-CM

## 2023-04-12 PROCEDURE — 99214 OFFICE O/P EST MOD 30 MIN: CPT | Performed by: NURSE PRACTITIONER

## 2023-04-12 RX ORDER — FENOFIBRATE 134 MG/1
CAPSULE ORAL
Qty: 90 | Status: ACTIVE | COMMUNITY

## 2023-04-12 RX ORDER — SIMVASTATIN 20 MG/1
TAKE 1 TABLET (20 MG) BY ORAL ROUTE ONCE DAILY IN THE EVENING TABLET, FILM COATED ORAL 1
Qty: 0 | Refills: 0 | Status: ACTIVE | COMMUNITY
Start: 1900-01-01

## 2023-04-12 RX ORDER — IBANDRONATE SODIUM 150 MG/1
TABLET, FILM COATED ORAL
Qty: 3 | Status: ACTIVE | COMMUNITY

## 2023-04-12 RX ORDER — PANTOPRAZOLE SODIUM 40 MG/1
1 TABLET TABLET, DELAYED RELEASE ORAL BID
Qty: 180 TABLET | Refills: 3 | Status: ACTIVE | COMMUNITY

## 2023-04-12 RX ORDER — PSYLLIUM HUSK 0.4 G
1 CAPSULE WITH 8 OUNCES OF LIQUID AT BEDTIME CAPSULE ORAL DAILY
Qty: 60 | Refills: 6 | OUTPATIENT

## 2023-04-12 RX ORDER — PSYLLIUM HUSK 0.4 G
1 CAPSULE WITH 8 OUNCES OF LIQUID AT BEDTIME CAPSULE ORAL DAILY
Qty: 60 | Refills: 6 | Status: ACTIVE | COMMUNITY

## 2023-04-12 RX ORDER — PANTOPRAZOLE SODIUM 40 MG/1
1 TABLET TABLET, DELAYED RELEASE ORAL ONCE A DAY
Qty: 90 TABLET | Refills: 3

## 2023-04-12 RX ORDER — VENLAFAXINE HYDROCHLORIDE 75 MG/1
TAKE 1 TABLET (75 MG) BY ORAL ROUTE ONCE DAILY IN THE MORNING AT THE SAME TIME EACH DAY WITH FOOD TABLET, EXTENDED RELEASE ORAL 1
Qty: 0 | Refills: 0 | Status: ACTIVE | COMMUNITY
Start: 1900-01-01

## 2023-04-12 RX ORDER — PANTOPRAZOLE SODIUM 40 MG/1
TAKE 1 TABLET TWICE A DAY TABLET, DELAYED RELEASE ORAL
Qty: 180 TABLET | Refills: 4 | Status: ACTIVE | COMMUNITY

## 2023-04-12 RX ORDER — FAMOTIDINE 40 MG/1
1 TABLET 1-2 TIMES A DAY PRN REFLUX TABLET, FILM COATED ORAL ONCE A DAY
Qty: 90 TABLET | Refills: 3 | OUTPATIENT

## 2023-04-12 RX ORDER — FAMOTIDINE 40 MG/1
1 TABLET 1-2 TIMES A DAY PRN REFLUX TABLET, FILM COATED ORAL BID
Qty: 180 TABLET | Refills: 3 | Status: ACTIVE | COMMUNITY

## 2023-04-12 NOTE — HPI-OTHER HISTORIES
2/7/2018 Ms Aurora Nicole is here for f/u of anemia and GERD. She is followed by LUCHO Juares for anemia. She takes oral iron TID. She had an EGD/COLON 5/2017 that showed some gastritis and hiatal hernia. Biopsies showed H pylori. She was treated with PrevPack with stool that proved eradication. She has been taking protonix 40mg BID but ran out recently 2 weeks ago. Her reflux had been well controlled with only occassional break through. She is having pain in her epigastrum since off Protonix. She denies any bleeding. She had labs last week with Dr. Villarreal. CS   6/30/2020 Ms. Nicole is evaluated via telehealth for GERD and OLEGARIO. She was last seen in 2018. She was anemic in 2017 with a work up that showed hpylori and a large hiatal hernia. She was treated with prevpak and stool proved eradication. Her reflux had been controlled with protonix 40mg BID. She ran out of her medication earlier this year. She had been feeling ok until 2 weeks ago. She started having mid chest pain bringing her to the ER. She had an EGD with Dr. Núñez that showed a 8cm hiatal hernia and camerons erosions- path negative for hpylori. Her Hbg was 8. She is back on her protonix and feeling a little better. SHe is seeing Dr. KISHOR Juares for iron transfusion soon. CS   9/2/2020 Ms. Nicole is evaluated for f/u GERD and OLEGARIO. She has been on protonix 40mg BID and carafate. Her Hbg has improved from 8 to now 12. She has her energy again and feeling well. She denies any return of reflux. Overall, she is feeling well. CS   3/3/2021 Ms. Nicole is evaluated for f/u GERD and OLEGARIO. She had been on protonix 40mg BID and carafate last year and her Hbg improved from 8 to 12. She was advised to wean off the carafate but stay on protonix because her Hbg continues to drop off of it. Over the last few months, she felt so well she stopped taking the protonix. Recently her Hbg dropped to 11. She is seeing Dr KISHOR Juarse tomorrow for blood work. She denies any melena or blood in her stool. She has had reflux symptoms and taking rolaids prn. CS   7/21/2021 Ms. Nicole is here for f/u GERD and OLEGARIO. She has a large hiatal hernia and thought to be the cause of her anemia. In March, her Hbg was 11 with low iron. She was off the carafate and protonix. The protonix was added back BID and pepcid prn. Since then, her Hbg has improved to 13. Her reflux is fairly well controlled with the medication and small meals. She only uses the pepcid about 2-3 times a week. Overall, she is feeling well. CS   10/18/2021 Ms. Nicole is here for f/u GERD and OLEGARIO. She has a large hiatal hernia and thought to be the cause of her anemia. In March, her Hbg was 11 with low iron. She was off the carafate and protonix. The protonix was added back BID and pepcid prn. Her Hbg has improved to 13. She had repeat labs last month again Hbg of 13. She denies any reflux and has not needed the pepcid. She had a DEXA scan with worsening osteoparosis.  CS  5/25/2022 Ms. Nicole is here for f/u GERD and OLEGARIO. She was last seen in October. Her Hbg had returned to normal BID ppi so it was reduced to daily. Today, she reports no return of reflux but her Hgb and iron have dropped and she has been getting iron infusions. She has also had a change in her bowels with no BM for several days followed by loose unformed stool. She denies any blood in her stool or melena. CS  9/15/2022 EGD: 6cm hiatal hernia, gastritis. Path negative Colonoscopy: excessive looping, leftsided diverticulosis, hemorrhiods  10/5/2022 Ms. Nicole is here for f/u GERD and OLEGARIO. In May, her Hbg and iron had dropped. She had an EGD and colon with no source. She is getting labs in the next month. She denies any blood in her stool or melena. Her reflux is well controlled with protonix 40mg daily. She has  not needed the pepcid. Her bowels are moving well with the fiber. CS

## 2023-04-12 NOTE — HPI-TODAY'S VISIT:
4/12/2023 Ms. Nicole is here for f/u GERD and OLEGARIO. She was doing well at her last OV and her Hbg was normal. She reports her Hbg and iron have dropped again and had to have iron.  Her reflux is well controlled with protonix 40mg daily. Her bowels are moving well with the fiber. She has never had an VCE. CS

## 2023-04-19 PROBLEM — 87522002: Status: ACTIVE | Noted: 2023-04-19

## 2023-05-03 ENCOUNTER — OFFICE VISIT (OUTPATIENT)
Dept: URBAN - NONMETROPOLITAN AREA CLINIC 1 | Facility: CLINIC | Age: 83
End: 2023-05-03

## 2024-01-25 ENCOUNTER — TELEPHONE ENCOUNTER (OUTPATIENT)
Dept: URBAN - NONMETROPOLITAN AREA CLINIC 2 | Facility: CLINIC | Age: 84
End: 2024-01-25

## 2024-01-26 ENCOUNTER — OFFICE VISIT (OUTPATIENT)
Dept: URBAN - NONMETROPOLITAN AREA CLINIC 1 | Facility: CLINIC | Age: 84
End: 2024-01-26

## 2024-02-21 ENCOUNTER — OV EP (OUTPATIENT)
Dept: URBAN - NONMETROPOLITAN AREA CLINIC 13 | Facility: CLINIC | Age: 84
End: 2024-02-21
Payer: MEDICARE

## 2024-02-21 VITALS
WEIGHT: 141 LBS | HEART RATE: 80 BPM | BODY MASS INDEX: 24.98 KG/M2 | DIASTOLIC BLOOD PRESSURE: 91 MMHG | HEIGHT: 63 IN | SYSTOLIC BLOOD PRESSURE: 150 MMHG

## 2024-02-21 DIAGNOSIS — R10.84 GENERALIZED ABDOMINAL PAIN: ICD-10-CM

## 2024-02-21 DIAGNOSIS — Z12.11 ROUTINE COLON: ICD-10-CM

## 2024-02-21 DIAGNOSIS — R19.4 CHANGE IN BOWEL HABIT: ICD-10-CM

## 2024-02-21 DIAGNOSIS — R19.5 ABNORMAL FINDINGS IN STOOL: ICD-10-CM

## 2024-02-21 DIAGNOSIS — K21.9 GERD (GASTROESOPHAGEAL REFLUX DISEASE): ICD-10-CM

## 2024-02-21 DIAGNOSIS — D50.9 IRON DEFICIENCY ANEMIA, UNSPECIFIED IRON DEFICIENCY ANEMIA TYPE: ICD-10-CM

## 2024-02-21 PROCEDURE — 99213 OFFICE O/P EST LOW 20 MIN: CPT | Performed by: NURSE PRACTITIONER

## 2024-02-21 RX ORDER — PANTOPRAZOLE SODIUM 40 MG/1
TAKE 1 TABLET TWICE A DAY TABLET, DELAYED RELEASE ORAL
Qty: 180 TABLET | Refills: 4 | Status: ACTIVE | COMMUNITY

## 2024-02-21 RX ORDER — SIMVASTATIN 20 MG/1
TAKE 1 TABLET (20 MG) BY ORAL ROUTE ONCE DAILY IN THE EVENING TABLET, FILM COATED ORAL 1
Qty: 0 | Refills: 0 | Status: ACTIVE | COMMUNITY
Start: 1900-01-01

## 2024-02-21 RX ORDER — FAMOTIDINE 40 MG/1
1 TABLET 1-2 TIMES A DAY PRN REFLUX TABLET, FILM COATED ORAL ONCE A DAY
Qty: 90 TABLET | Refills: 3 | OUTPATIENT

## 2024-02-21 RX ORDER — PANTOPRAZOLE SODIUM 40 MG/1
1 TABLET TABLET, DELAYED RELEASE ORAL ONCE A DAY
Qty: 90 TABLET | Refills: 3 | Status: ACTIVE | COMMUNITY

## 2024-02-21 RX ORDER — FENOFIBRATE 134 MG/1
CAPSULE ORAL
Qty: 90 | Status: ACTIVE | COMMUNITY

## 2024-02-21 RX ORDER — VENLAFAXINE HYDROCHLORIDE 75 MG/1
TAKE 1 TABLET (75 MG) BY ORAL ROUTE ONCE DAILY IN THE MORNING AT THE SAME TIME EACH DAY WITH FOOD TABLET, EXTENDED RELEASE ORAL 1
Qty: 0 | Refills: 0 | Status: ACTIVE | COMMUNITY
Start: 1900-01-01

## 2024-02-21 RX ORDER — PSYLLIUM HUSK 0.4 G
1 CAPSULE WITH 8 OUNCES OF LIQUID AT BEDTIME CAPSULE ORAL DAILY
Qty: 60 | Refills: 6 | Status: ACTIVE | COMMUNITY

## 2024-02-21 RX ORDER — FAMOTIDINE 40 MG/1
1 TABLET 1-2 TIMES A DAY PRN REFLUX TABLET, FILM COATED ORAL ONCE A DAY
Qty: 90 TABLET | Refills: 3 | Status: ACTIVE | COMMUNITY

## 2024-02-21 RX ORDER — PSYLLIUM HUSK 0.4 G
1 CAPSULE WITH 8 OUNCES OF LIQUID AT BEDTIME CAPSULE ORAL DAILY
Qty: 60 | Refills: 6 | OUTPATIENT

## 2024-02-21 RX ORDER — PANTOPRAZOLE SODIUM 40 MG/1
1 TABLET TABLET, DELAYED RELEASE ORAL ONCE A DAY
Qty: 90 TABLET | Refills: 3

## 2024-02-21 RX ORDER — IBANDRONATE SODIUM 150 MG/1
TABLET, FILM COATED ORAL
Qty: 3 | Status: ACTIVE | COMMUNITY

## 2024-02-21 NOTE — HPI-OTHER HISTORIES
2/7/2018 Ms Aurora Nicole is here for f/u of anemia and GERD. She is followed by LUCHO Juares for anemia. She takes oral iron TID. She had an EGD/COLON 5/2017 that showed some gastritis and hiatal hernia. Biopsies showed H pylori. She was treated with PrevPack with stool that proved eradication. She has been taking protonix 40mg BID but ran out recently 2 weeks ago. Her reflux had been well controlled with only occassional break through. She is having pain in her epigastrum since off Protonix. She denies any bleeding. She had labs last week with Dr. Villarreal. CS   6/30/2020 Ms. Nicole is evaluated via telehealth for GERD and OLEGARIO. She was last seen in 2018. She was anemic in 2017 with a work up that showed hpylori and a large hiatal hernia. She was treated with prevpak and stool proved eradication. Her reflux had been controlled with protonix 40mg BID. She ran out of her medication earlier this year. She had been feeling ok until 2 weeks ago. She started having mid chest pain bringing her to the ER. She had an EGD with Dr. Núñez that showed a 8cm hiatal hernia and camerons erosions- path negative for hpylori. Her Hbg was 8. She is back on her protonix and feeling a little better. SHe is seeing Dr. KISHOR Juares for iron transfusion soon. CS   9/2/2020 Ms. Nicole is evaluated for f/u GERD and OLEGARIO. She has been on protonix 40mg BID and carafate. Her Hbg has improved from 8 to now 12. She has her energy again and feeling well. She denies any return of reflux. Overall, she is feeling well. CS   3/3/2021 Ms. Nicole is evaluated for f/u GERD and OLEGARIO. She had been on protonix 40mg BID and carafate last year and her Hbg improved from 8 to 12. She was advised to wean off the carafate but stay on protonix because her Hbg continues to drop off of it. Over the last few months, she felt so well she stopped taking the protonix. Recently her Hbg dropped to 11. She is seeing Dr KISHOR Juares tomorrow for blood work. She denies any melena or blood in her stool. She has had reflux symptoms and taking rolaids prn. CS   7/21/2021 Ms. Nicole is here for f/u GERD and OLEGARIO. She has a large hiatal hernia and thought to be the cause of her anemia. In March, her Hbg was 11 with low iron. She was off the carafate and protonix. The protonix was added back BID and pepcid prn. Since then, her Hbg has improved to 13. Her reflux is fairly well controlled with the medication and small meals. She only uses the pepcid about 2-3 times a week. Overall, she is feeling well. CS   10/18/2021 Ms. Nicole is here for f/u GERD and OLEGARIO. She has a large hiatal hernia and thought to be the cause of her anemia. In March, her Hbg was 11 with low iron. She was off the carafate and protonix. The protonix was added back BID and pepcid prn. Her Hbg has improved to 13. She had repeat labs last month again Hbg of 13. She denies any reflux and has not needed the pepcid. She had a DEXA scan with worsening osteoparosis.  CS  5/25/2022 Ms. Nicole is here for f/u GERD and OLEGARIO. She was last seen in October. Her Hbg had returned to normal BID ppi so it was reduced to daily. Today, she reports no return of reflux but her Hgb and iron have dropped and she has been getting iron infusions. She has also had a change in her bowels with no BM for several days followed by loose unformed stool. She denies any blood in her stool or melena. CS  9/15/2022 EGD: 6cm hiatal hernia, gastritis. Path negative Colonoscopy: excessive looping, leftsided diverticulosis, hemorrhiods  10/5/2022 Ms. Nicole is here for f/u GERD and OLEGARIO. In May, her Hbg and iron had dropped. She had an EGD and colon with no source. She is getting labs in the next month. She denies any blood in her stool or melena. Her reflux is well controlled with protonix 40mg daily. She has  not needed the pepcid. Her bowels are moving well with the fiber. CS  4/12/2023 Ms. Nicole is here for f/u GERD and OLEGARIO. She was doing well at her last OV and her Hbg was normal. She reports her Hbg and iron have dropped again and had to have iron. Her reflux is well controlled with protonix 40mg daily. Her bowels are moving well with the fiber. She has never had an VCE. CS

## 2024-02-21 NOTE — HPI-TODAY'S VISIT:
2/21/2024 Ms. Nicole is here for f/u GERD and OLEGARIO. She was last seen in May 2023. Her Hbg and iron dropped again and she had to have iron. She was scheduled for a CTE and VCE. She had some family matters that prevented her from completing these. She called in January for a f/u and the  put her in for a VCE. This was denied by insurance. She denies any further anemia- her Hbg is 13 and iron/ferritin are normal.   Her reflux is well controlled with protonix 40mg daily- she has not needed the pepcid. Her bowels are moving well with the fiber.  CS

## 2024-08-22 ENCOUNTER — OFFICE VISIT (OUTPATIENT)
Dept: URBAN - NONMETROPOLITAN AREA CLINIC 13 | Facility: CLINIC | Age: 84
End: 2024-08-22
Payer: MEDICARE

## 2024-08-22 ENCOUNTER — DASHBOARD ENCOUNTERS (OUTPATIENT)
Age: 84
End: 2024-08-22

## 2024-08-22 VITALS — HEIGHT: 63 IN | HEART RATE: 96 BPM | BODY MASS INDEX: 24.63 KG/M2 | WEIGHT: 139 LBS

## 2024-08-22 DIAGNOSIS — K21.9 GERD (GASTROESOPHAGEAL REFLUX DISEASE): ICD-10-CM

## 2024-08-22 DIAGNOSIS — R19.4 CHANGE IN BOWEL HABIT: ICD-10-CM

## 2024-08-22 PROCEDURE — 99213 OFFICE O/P EST LOW 20 MIN: CPT | Performed by: NURSE PRACTITIONER

## 2024-08-22 RX ORDER — PANTOPRAZOLE SODIUM 40 MG/1
1 TABLET TABLET, DELAYED RELEASE ORAL ONCE A DAY
Qty: 90 TABLET | Refills: 3

## 2024-08-22 RX ORDER — PSYLLIUM HUSK 0.4 G
1 CAPSULE WITH 8 OUNCES OF LIQUID AT BEDTIME CAPSULE ORAL DAILY
Qty: 60 | Refills: 6 | Status: ACTIVE | COMMUNITY

## 2024-08-22 RX ORDER — FAMOTIDINE 40 MG/1
1 TABLET 1-2 TIMES A DAY PRN REFLUX TABLET, FILM COATED ORAL ONCE A DAY
Qty: 90 TABLET | Refills: 3 | Status: ACTIVE | COMMUNITY

## 2024-08-22 RX ORDER — FENOFIBRATE 134 MG/1
CAPSULE ORAL
Qty: 90 | Status: ACTIVE | COMMUNITY

## 2024-08-22 RX ORDER — VENLAFAXINE HYDROCHLORIDE 75 MG/1
TAKE 1 TABLET (75 MG) BY ORAL ROUTE ONCE DAILY IN THE MORNING AT THE SAME TIME EACH DAY WITH FOOD TABLET, EXTENDED RELEASE ORAL 1
Qty: 0 | Refills: 0 | Status: ACTIVE | COMMUNITY
Start: 1900-01-01

## 2024-08-22 RX ORDER — SIMVASTATIN 20 MG/1
TAKE 1 TABLET (20 MG) BY ORAL ROUTE ONCE DAILY IN THE EVENING TABLET, FILM COATED ORAL 1
Qty: 0 | Refills: 0 | Status: ACTIVE | COMMUNITY
Start: 1900-01-01

## 2024-08-22 RX ORDER — IBANDRONATE SODIUM 150 MG/1
TABLET, FILM COATED ORAL
Qty: 3 | Status: ACTIVE | COMMUNITY

## 2024-08-22 RX ORDER — FAMOTIDINE 40 MG/1
1 TABLET 1-2 TIMES A DAY PRN REFLUX TABLET, FILM COATED ORAL ONCE A DAY
Qty: 90 TABLET | Refills: 3 | OUTPATIENT

## 2024-08-22 RX ORDER — PANTOPRAZOLE SODIUM 40 MG/1
TAKE 1 TABLET TWICE A DAY TABLET, DELAYED RELEASE ORAL
Qty: 180 TABLET | Refills: 4 | Status: ACTIVE | COMMUNITY

## 2024-08-22 RX ORDER — PANTOPRAZOLE SODIUM 40 MG/1
1 TABLET TABLET, DELAYED RELEASE ORAL ONCE A DAY
Qty: 90 TABLET | Refills: 3 | Status: ACTIVE | COMMUNITY

## 2024-08-22 RX ORDER — PSYLLIUM HUSK 0.4 G
1 CAPSULE WITH 8 OUNCES OF LIQUID AT BEDTIME CAPSULE ORAL DAILY
Qty: 60 | Refills: 6 | OUTPATIENT

## 2024-08-22 NOTE — HPI-OTHER HISTORIES
2/7/2018 Ms Aurora Nicole is here for f/u of anemia and GERD. She is followed by LUCHO Juares for anemia. She takes oral iron TID. She had an EGD/COLON 5/2017 that showed some gastritis and hiatal hernia. Biopsies showed H pylori. She was treated with PrevPack with stool that proved eradication. She has been taking protonix 40mg BID but ran out recently 2 weeks ago. Her reflux had been well controlled with only occassional break through. She is having pain in her epigastrum since off Protonix. She denies any bleeding. She had labs last week with Dr. Villarreal. CS   6/30/2020 Ms. Nicole is evaluated via telehealth for GERD and OLEGARIO. She was last seen in 2018. She was anemic in 2017 with a work up that showed hpylori and a large hiatal hernia. She was treated with prevpak and stool proved eradication. Her reflux had been controlled with protonix 40mg BID. She ran out of her medication earlier this year. She had been feeling ok until 2 weeks ago. She started having mid chest pain bringing her to the ER. She had an EGD with Dr. Núñez that showed a 8cm hiatal hernia and camerons erosions- path negative for hpylori. Her Hbg was 8. She is back on her protonix and feeling a little better. SHe is seeing Dr. KISHOR Juares for iron transfusion soon. CS   9/2/2020 Ms. Nicole is evaluated for f/u GERD and OLEGARIO. She has been on protonix 40mg BID and carafate. Her Hbg has improved from 8 to now 12. She has her energy again and feeling well. She denies any return of reflux. Overall, she is feeling well. CS   3/3/2021 Ms. Nicole is evaluated for f/u GERD and OLEGARIO. She had been on protonix 40mg BID and carafate last year and her Hbg improved from 8 to 12. She was advised to wean off the carafate but stay on protonix because her Hbg continues to drop off of it. Over the last few months, she felt so well she stopped taking the protonix. Recently her Hbg dropped to 11. She is seeing Dr KISHOR Juares tomorrow for blood work. She denies any melena or blood in her stool. She has had reflux symptoms and taking rolaids prn. CS   7/21/2021 Ms. Nicole is here for f/u GERD and OLEGARIO. She has a large hiatal hernia and thought to be the cause of her anemia. In March, her Hbg was 11 with low iron. She was off the carafate and protonix. The protonix was added back BID and pepcid prn. Since then, her Hbg has improved to 13. Her reflux is fairly well controlled with the medication and small meals. She only uses the pepcid about 2-3 times a week. Overall, she is feeling well. CS   10/18/2021 Ms. Nicole is here for f/u GERD and OLEGARIO. She has a large hiatal hernia and thought to be the cause of her anemia. In March, her Hbg was 11 with low iron. She was off the carafate and protonix. The protonix was added back BID and pepcid prn. Her Hbg has improved to 13. She had repeat labs last month again Hbg of 13. She denies any reflux and has not needed the pepcid. She had a DEXA scan with worsening osteoparosis.  CS  5/25/2022 Ms. Nicole is here for f/u GERD and OLEGARIO. She was last seen in October. Her Hbg had returned to normal BID ppi so it was reduced to daily. Today, she reports no return of reflux but her Hgb and iron have dropped and she has been getting iron infusions. She has also had a change in her bowels with no BM for several days followed by loose unformed stool. She denies any blood in her stool or melena. CS  9/15/2022 EGD: 6cm hiatal hernia, gastritis. Path negative Colonoscopy: excessive looping, leftsided diverticulosis, hemorrhiods  10/5/2022 Ms. Nicole is here for f/u GERD and OLEGARIO. In May, her Hbg and iron had dropped. She had an EGD and colon with no source. She is getting labs in the next month. She denies any blood in her stool or melena. Her reflux is well controlled with protonix 40mg daily. She has  not needed the pepcid. Her bowels are moving well with the fiber. CS  4/12/2023 Ms. Nicole is here for f/u GERD and OLEGARIO. She was doing well at her last OV and her Hbg was normal. She reports her Hbg and iron have dropped again and had to have iron. Her reflux is well controlled with protonix 40mg daily. Her bowels are moving well with the fiber. She has never had an VCE. CS  2/21/2024 Ms. Nicole is here for f/u GERD and OLEGARIO. She was last seen in May 2023. Her Hbg and iron dropped again and she had to have iron. She was scheduled for a CTE and VCE. She had some family matters that prevented her from completing these. She called in January for a f/u and the  put her in for a VCE. This was denied by insurance. She denies any further anemia- her Hbg is 13 and iron/ferritin are normal.   Her reflux is well controlled with protonix 40mg daily- she has not needed the pepcid. Her bowels are moving well with the fiber.  CS

## 2024-08-22 NOTE — HPI-TODAY'S VISIT:
8/22/2024 Ms. Nicole is here for f/u GERD and OLEGARIO. At her last OV, her Hbg was 13 and iron/ferritin were normal.   Her reflux was well controlled with protonix 40mg daily and her bowels were normal on fiber. Today, she contines to do well. She rarely needs the pepcid but when she does it works. The fiber is keeping her stools regular. Her last 8/12 were normal with a Hbg of 13 and normal MVC. Overall, she feels well and has good energy.  CS

## 2025-07-07 ENCOUNTER — CLAIMS CREATED FROM THE CLAIM WINDOW (OUTPATIENT)
Dept: URBAN - METROPOLITAN AREA MEDICAL CENTER 1 | Facility: MEDICAL CENTER | Age: 85
End: 2025-07-07
Payer: MEDICARE

## 2025-07-07 DIAGNOSIS — K92.1 ACUTE MELENA: ICD-10-CM

## 2025-07-07 DIAGNOSIS — K44.9 DIAPHRAGMATIC HERNIA: ICD-10-CM

## 2025-07-07 DIAGNOSIS — R63.4 ABNORMAL INTENTIONAL WEIGHT LOSS: ICD-10-CM

## 2025-07-07 DIAGNOSIS — D50.9 ANEMIA: ICD-10-CM

## 2025-07-07 PROCEDURE — 99254 IP/OBS CNSLTJ NEW/EST MOD 60: CPT | Performed by: REGISTERED NURSE

## 2025-07-07 PROCEDURE — G8427 DOCREV CUR MEDS BY ELIG CLIN: HCPCS | Performed by: REGISTERED NURSE

## 2025-07-07 PROCEDURE — 99222 1ST HOSP IP/OBS MODERATE 55: CPT | Performed by: REGISTERED NURSE

## 2025-07-08 ENCOUNTER — CLAIMS CREATED FROM THE CLAIM WINDOW (OUTPATIENT)
Dept: URBAN - METROPOLITAN AREA MEDICAL CENTER 1 | Facility: MEDICAL CENTER | Age: 85
End: 2025-07-08

## 2025-07-08 ENCOUNTER — CLAIMS CREATED FROM THE CLAIM WINDOW (OUTPATIENT)
Dept: URBAN - METROPOLITAN AREA MEDICAL CENTER 1 | Facility: MEDICAL CENTER | Age: 85
End: 2025-07-08
Payer: MEDICARE

## 2025-07-08 ENCOUNTER — LAB OUTSIDE AN ENCOUNTER (OUTPATIENT)
Dept: URBAN - NONMETROPOLITAN AREA CLINIC 2 | Facility: CLINIC | Age: 85
End: 2025-07-08

## 2025-07-08 DIAGNOSIS — K44.9 DIAPHRAGMATIC HERNIA: ICD-10-CM

## 2025-07-08 DIAGNOSIS — K31.89 OTHER DISEASES OF STOMACH AND DUODENUM: ICD-10-CM

## 2025-07-08 DIAGNOSIS — D50.9 ANEMIA: ICD-10-CM

## 2025-07-08 DIAGNOSIS — K29.60 OTHER GASTRITIS WITHOUT BLEEDING: ICD-10-CM

## 2025-07-08 PROCEDURE — 43235 EGD DIAGNOSTIC BRUSH WASH: CPT | Performed by: INTERNAL MEDICINE

## 2025-07-08 PROCEDURE — 99232 SBSQ HOSP IP/OBS MODERATE 35: CPT | Performed by: INTERNAL MEDICINE

## 2025-07-08 PROCEDURE — 43239 EGD BIOPSY SINGLE/MULTIPLE: CPT | Performed by: INTERNAL MEDICINE

## 2025-07-09 ENCOUNTER — CLAIMS CREATED FROM THE CLAIM WINDOW (OUTPATIENT)
Dept: URBAN - METROPOLITAN AREA MEDICAL CENTER 1 | Facility: MEDICAL CENTER | Age: 85
End: 2025-07-09
Payer: MEDICARE

## 2025-07-09 ENCOUNTER — LAB OUTSIDE AN ENCOUNTER (OUTPATIENT)
Dept: URBAN - NONMETROPOLITAN AREA CLINIC 2 | Facility: CLINIC | Age: 85
End: 2025-07-09

## 2025-07-09 DIAGNOSIS — K44.9 DIAPHRAGMATIC HERNIA: ICD-10-CM

## 2025-07-09 DIAGNOSIS — D50.9 ANEMIA: ICD-10-CM

## 2025-07-09 LAB
AP CASE REPORT: (no result)
AP FINAL DIAGNOSIS: (no result)
AP GROSS DESCRIPTION: (no result)
AP MICROSCOPIC DESCRIPTION: (no result)
HEMATOCRIT: 27.3
HEMOGLOBIN: 9

## 2025-07-09 PROCEDURE — 99232 SBSQ HOSP IP/OBS MODERATE 35: CPT | Performed by: INTERNAL MEDICINE

## 2025-07-29 ENCOUNTER — TELEPHONE ENCOUNTER (OUTPATIENT)
Dept: URBAN - NONMETROPOLITAN AREA CLINIC 2 | Facility: CLINIC | Age: 85
End: 2025-07-29

## 2025-07-29 RX ORDER — FAMOTIDINE 40 MG/1
1 TABLET 1-2 TIMES A DAY PRN REFLUX TABLET, FILM COATED ORAL ONCE A DAY
Qty: 90 TABLET | Refills: 3

## 2025-08-04 ENCOUNTER — TELEPHONE ENCOUNTER (OUTPATIENT)
Dept: URBAN - NONMETROPOLITAN AREA CLINIC 2 | Facility: CLINIC | Age: 85
End: 2025-08-04

## 2025-08-21 ENCOUNTER — OFFICE VISIT (OUTPATIENT)
Dept: URBAN - NONMETROPOLITAN AREA CLINIC 13 | Facility: CLINIC | Age: 85
End: 2025-08-21
Payer: MEDICARE

## 2025-08-21 DIAGNOSIS — D50.9 ANEMIA: ICD-10-CM

## 2025-08-21 DIAGNOSIS — K21.9 GERD (GASTROESOPHAGEAL REFLUX DISEASE): ICD-10-CM

## 2025-08-21 DIAGNOSIS — R19.4 CHANGE IN BOWEL HABIT: ICD-10-CM

## 2025-08-21 DIAGNOSIS — R10.9 ABDOMINAL CRAMPING: ICD-10-CM

## 2025-08-21 DIAGNOSIS — Z12.11 ROUTINE COLON: ICD-10-CM

## 2025-08-21 DIAGNOSIS — R19.5 ABNORMAL FINDINGS IN STOOL: ICD-10-CM

## 2025-08-21 PROCEDURE — 99213 OFFICE O/P EST LOW 20 MIN: CPT | Performed by: NURSE PRACTITIONER

## 2025-08-21 RX ORDER — PANTOPRAZOLE SODIUM 40 MG/1
TAKE 1 TABLET TWICE A DAY TABLET, DELAYED RELEASE ORAL
Qty: 180 TABLET | Refills: 4 | Status: ACTIVE | COMMUNITY

## 2025-08-21 RX ORDER — PANTOPRAZOLE SODIUM 40 MG/1
1 TABLET TABLET, DELAYED RELEASE ORAL ONCE A DAY
Qty: 90 TABLET | Refills: 3 | Status: DISCONTINUED | COMMUNITY

## 2025-08-21 RX ORDER — VENLAFAXINE HYDROCHLORIDE 75 MG/1
TAKE 1 TABLET (75 MG) BY ORAL ROUTE ONCE DAILY IN THE MORNING AT THE SAME TIME EACH DAY WITH FOOD TABLET, EXTENDED RELEASE ORAL 1
Qty: 0 | Refills: 0 | Status: ACTIVE | COMMUNITY
Start: 1900-01-01

## 2025-08-21 RX ORDER — PSYLLIUM HUSK 0.4 G
1 CAPSULE WITH 8 OUNCES OF LIQUID AT BEDTIME CAPSULE ORAL DAILY
Qty: 60 | Refills: 6 | Status: DISCONTINUED | COMMUNITY

## 2025-08-21 RX ORDER — FAMOTIDINE 40 MG/1
1 TABLET 1-2 TIMES A DAY PRN REFLUX TABLET, FILM COATED ORAL ONCE A DAY
Qty: 90 TABLET | Refills: 3 | OUTPATIENT
Start: 2025-08-21

## 2025-08-21 RX ORDER — SIMVASTATIN 20 MG/1
TAKE 1 TABLET (20 MG) BY ORAL ROUTE ONCE DAILY IN THE EVENING TABLET, FILM COATED ORAL 1
Qty: 0 | Refills: 0 | Status: ACTIVE | COMMUNITY
Start: 1900-01-01

## 2025-08-21 RX ORDER — FENOFIBRATE 134 MG/1
CAPSULE ORAL
Qty: 90 | Status: ACTIVE | COMMUNITY

## 2025-08-21 RX ORDER — PSYLLIUM HUSK 0.4 G
1 CAPSULE WITH 8 OUNCES OF LIQUID AT BEDTIME CAPSULE ORAL DAILY
Qty: 60 | Refills: 6 | OUTPATIENT
Start: 2025-08-21 | End: 2026-03-19

## 2025-08-21 RX ORDER — FAMOTIDINE 40 MG/1
1 TABLET 1-2 TIMES A DAY PRN REFLUX TABLET, FILM COATED ORAL ONCE A DAY
Qty: 90 TABLET | Refills: 3 | Status: ACTIVE | COMMUNITY

## 2025-08-21 RX ORDER — PANTOPRAZOLE SODIUM 40 MG/1
1 TABLET TABLET, DELAYED RELEASE ORAL ONCE A DAY
Qty: 90 TABLET | Refills: 3
Start: 2025-08-21

## 2025-08-21 RX ORDER — IBANDRONATE SODIUM 150 MG/1
TABLET, FILM COATED ORAL
Qty: 3 | Status: DISCONTINUED | COMMUNITY

## 2025-08-26 ENCOUNTER — TELEPHONE ENCOUNTER (OUTPATIENT)
Dept: URBAN - NONMETROPOLITAN AREA CLINIC 2 | Facility: CLINIC | Age: 85
End: 2025-08-26

## 2025-08-26 RX ORDER — FAMOTIDINE 40 MG/1
1 TABLET 1-2 TIMES A DAY PRN REFLUX TABLET, FILM COATED ORAL
Qty: 180 TABLET | Refills: 3
Start: 2025-08-21